# Patient Record
Sex: MALE | Race: BLACK OR AFRICAN AMERICAN
[De-identification: names, ages, dates, MRNs, and addresses within clinical notes are randomized per-mention and may not be internally consistent; named-entity substitution may affect disease eponyms.]

---

## 2018-05-14 ENCOUNTER — HOSPITAL ENCOUNTER (EMERGENCY)
Dept: HOSPITAL 97 - ER | Age: 23
LOS: 1 days | Discharge: HOME | End: 2018-05-15
Payer: COMMERCIAL

## 2018-05-14 DIAGNOSIS — Z72.0: ICD-10-CM

## 2018-05-14 DIAGNOSIS — J30.9: Primary | ICD-10-CM

## 2018-05-14 PROCEDURE — 87081 CULTURE SCREEN ONLY: CPT

## 2018-05-14 PROCEDURE — 99283 EMERGENCY DEPT VISIT LOW MDM: CPT

## 2018-05-14 PROCEDURE — 71046 X-RAY EXAM CHEST 2 VIEWS: CPT

## 2018-05-14 PROCEDURE — 87070 CULTURE OTHR SPECIMN AEROBIC: CPT

## 2018-05-15 NOTE — ER
Nurse's Notes                                                                                     

 [facility]                                                                

Name: Tito Anthony                                                                                

Age: 22 yrs                                                                                       

Sex: Male                                                                                         

: 1995                                                                                   

MRN: [id number]                                                                                   

Arrival Date: 2018                                                                          

Time: 23:01                                                                                       

Account#: [id number]                                                                            

Bed 16                                                                                            

Private MD:                                                                                       

Diagnosis: Allergic rhinitis, unspecified                                                         

                                                                                                  

Presentation:                                                                                     

                                                                                             

23:10 Presenting complaint: Patient states: throat pain, nausea, vomiting, SOB started        ak1 

      tonight. no resp distress noted. Transition of care: patient was not received from          

      another setting of care. Onset of symptoms was May 14, 2018. Initial Sepsis Screen:         

      Does the patient meet any 2 criteria? No. Patient's initial sepsis screen is negative.      

      Does the patient have a suspected source of infection? No. Patient's initial sepsis         

      screen is negative. Care prior to arrival: None.                                            

23:10 Method Of Arrival: Ambulatory                                                           ak1 

23:10 Acuity: RASHI 4                                                                           ak1 

                                                                                                  

Triage Assessment:                                                                                

23:12 General: Appears in no apparent distress. Behavior is calm, cooperative. Pain:          ak1 

      Complains of pain in throat. Neuro: No deficits noted. Cardiovascular: No deficits          

      noted. Respiratory: Reports shortness of breath Onset: The symptoms/episode                 

      began/occurred today, the patient has mild shortness of breath. GI: No signs and/or         

      symptoms were reported involving the gastrointestinal system. : No signs and/or           

      symptoms were reported regarding the genitourinary system. Derm: No signs and/or            

      symptoms reported regarding the dermatologic system. Musculoskeletal: No signs and/or       

      symptoms reported regarding the musculoskeletal system.                                     

                                                                                                  

Historical:                                                                                       

- Allergies:                                                                                      

23:12 No Known Allergies;                                                                     ak1 

- Home Meds:                                                                                      

23:12 None [Active];                                                                          ak1 

- PMHx:                                                                                           

23:12 None;                                                                                   ak1 

- PSHx:                                                                                           

23:12 None;                                                                                   ak1 

                                                                                                  

- Immunization history:: Adult Immunizations unknown.                                             

- Social history:: Smoking status: Patient uses tobacco products, denies chronic                  

  smoking, but will smoke occasionally.                                                           

                                                                                                  

                                                                                                  

Screenin:13 Abuse screen: Denies threats or abuse. Denies injuries from another. Nutritional        ak1 

      screening: No deficits noted. Tuberculosis screening: No symptoms or risk factors           

      identified. Fall Risk None identified.                                                      

                                                                                                  

Assessment:                                                                                       

23:13 Respiratory: Airway is patent Respiratory effort is even, unlabored,                    ak1 

23:14 Cardiovascular: Rhythm is regular.                                                      ak1 

23:15 General: Appears in no apparent distress. uncomfortable, Behavior is calm, cooperative. bs1 

      Pain: Complains of pain in throat. Neuro: Level of Consciousness is awake, alert, obeys     

      commands, Oriented to person, place, time, situation. Neuro: Reports difficulty             

      swallowing. Cardiovascular: Denies chest pain, shortness of breath, Heart tones S1 S2       

      present Capillary refill < 3 seconds Patient's skin is warm and dry. Respiratory:           

      Reports Airway is patent Trachea midline Respiratory effort is even, unlabored,             

      Respiratory pattern is regular, symmetrical. Respiratory: Reports cough that is             

      non-productive, difficulty swallowing, states "I feel like there's something in the         

      back of my throat.". GI: Abdomen is non-distended, Bowel sounds present X 4 quads.          

      Reports vomiting. : No deficits noted. No signs and/or symptoms were reported             

      regarding the genitourinary system. EENT: Throat is pink Reports pain when swallowing.      

      Derm: Skin is intact, Skin is pink, warm \T\ dry. Musculoskeletal: Circulation, motion,     

      and sensation intact. Capillary refill < 3 seconds, Range of motion: intact in all          

      extremities.                                                                                

05/15                                                                                             

00:15 Reassessment: No changes from previously documented assessment. Patient and/or family   bs1 

      updated on plan of care and expected duration. Pain level reassessed. Patient is alert,     

      oriented x 3, equal unlabored respirations, skin warm/dry/pink. patient states "I feel      

      like there is something stuck in the back of my throat.".                                   

01:53 Reassessment: Patient appears in no apparent distress at this time. Patient and/or      bs1 

      family updated on plan of care and expected duration. Pain level reassessed. Patient is     

      alert, oriented x 3, equal unlabored respirations, skin warm/dry/pink. Patient states       

      symptoms have improved.                                                                     

                                                                                                  

Vital Signs:                                                                                      

                                                                                             

23:12  / 73; Pulse 91; Resp 20; Temp 98.4(TE); Pulse Ox 97% on R/A; Weight 90.72 kg     ak1 

      (R); Height 5 ft. 7 in. (170.18 cm) (R); Pain 9/10;                                         

05/15                                                                                             

00:15  / 72; Pulse 86; Resp 16; Pulse Ox 99% on R/A;                                    bs1 

01:15  / 74; Pulse 63; Resp 16; Temp 98(O); Pulse Ox 100% ; Pain 0/10;                  bs1 

                                                                                             

23:12 Body Mass Index 31.32 (90.72 kg, 170.18 cm)                                             ak1 

                                                                                                  

ED Course:                                                                                        

                                                                                             

23:01 Patient arrived in ED.                                                                  luis  

23:10 Diana Alatorre, RN is Primary Nurse.                                                 bs1 

23:11 Triage completed.                                                                       ak1 

23:12 Arm band placed on Patient placed in an exam room, Patient notified of wait time.       ak1 

23:13 Patient has correct armband on for positive identification.                             ak1 

23:33 Bell Padilla FNP-C is PHCP.                                                        snw 

23:33 Eder Franklin MD is Attending Physician.                                            snw 

23:47 Patient moved to radiology via wheelchair.                                              kw  

23:47 X-ray completed. Patient tolerated procedure well.                                      kw  

23:47 Patient moved back from radiology.                                                      kw  

23:47 Chest Pa And Lat (2 Views) XRAY In Process Unspecified.                                 EDMS

05/15                                                                                             

01:51 No provider procedures requiring assistance completed. Patient did not have IV access   bs1 

      during this emergency room visit.                                                           

                                                                                                  

Administered Medications:                                                                         

00:50 Drug: predniSONE 40 mg Route: PO;                                                       bs1 

01:53 Follow up: Response: No adverse reaction                                                bs1 

00:50 Drug: Pepcid 20 mg Route: PO;                                                           bs1 

01:53 Follow up: Response: No adverse reaction                                                bs1 

                                                                                                  

                                                                                                  

Outcome:                                                                                          

01:39 Discharge ordered by MD.                                                                snw 

01:51 Discharged to home ambulatory, with family.                                             bs1 

01:51 Condition: stable                                                                           

01:51 Discharge instructions given to patient, Instructed on discharge instructions, follow       

      up and referral plans. medication usage, Demonstrated understanding of instructions,        

      follow-up care, medications, Prescriptions given X 4, Patient/family state                  

      understanding of POC/follow up                                                              

01:53 Patient left the ED.                                                                    bs1 

                                                                                                  

Signatures:                                                                                       

Dispatcher [employer]                           EDMS                                                 

Bell Padilla FNP-C                 FNP-Csnw                                                  

Randi Whatley Kimberlee kw Krenek, Amber, RN                       RN   ak1                                                  

Diana Alatorre, RN                   RN   bs1                                                  

                                                                                                  

Corrections: (The following items were deleted from the chart)                                    

                                                                                             

23:46 23:15 EENT: No deficits noted. No signs and/or symptoms were reported regarding the     bs1 

      EENT system. bs1                                                                            

                                                                                                  

**************************************************************************************************

## 2018-05-15 NOTE — EDPHYS
Physician Documentation                                                                           

 [facility]                                                                

Name: Tito Anthony                                                                                

Age: 22 yrs                                                                                       

Sex: Male                                                                                         

: 1995                                                                                   

MRN: [id number]                                                                                   

Arrival Date: 2018                                                                          

Time: 23:01                                                                                       

Account#: [id number]                                                                            

Bed 16                                                                                            

Private MD:                                                                                       

ED Physician Eder Franklin                                                                     

HPI:                                                                                              

05/15                                                                                             

02:17 This 22 yrs old Black Male presents to ER via Ambulatory with complaints of Breathing   snw 

      Difficulty, Vomiting, Sore Throat.                                                          

02:17 The patient has shortness of breath at rest. Onset: The symptoms/episode began/occurred snw 

      suddenly, just prior to arrival. Duration: The symptoms are continuous. Associated          

      signs and symptoms: The patient has no apparent associated signs or symptoms. Severity      

      of symptoms: At their worst the symptoms were moderate. The patient has not experienced     

      similar symptoms in the past. The patient has not recently seen a physician. recent         

      severe allergies and post nasal drip.                                                       

                                                                                                  

Historical:                                                                                       

- Allergies:                                                                                      

                                                                                             

23:12 No Known Allergies;                                                                     ak1 

- Home Meds:                                                                                      

23:12 None [Active];                                                                          ak1 

- PMHx:                                                                                           

23:12 None;                                                                                   ak1 

- PSHx:                                                                                           

23:12 None;                                                                                   ak1 

                                                                                                  

- Immunization history:: Adult Immunizations unknown.                                             

- Social history:: Smoking status: Patient uses tobacco products, denies chronic                  

  smoking, but will smoke occasionally.                                                           

                                                                                                  

                                                                                                  

ROS:                                                                                              

05/15                                                                                             

02:15 Constitutional: Negative for fever, chills, and weight loss, Eyes: Negative for injury, snw 

      pain, redness, and discharge, ENT: Negative for injury and discharge, positive for sore     

      throat and feeling like something is hung in his throat Neck: Negative for injury,          

      pain, and swelling, Cardiovascular: Negative for chest pain, palpitations, and edema,       

      Respiratory: Positive for shortness of breath, cough, No wheezing or pleuritic chest        

      pain, Abdomen/GI: Negative for abdominal pain, nausea, vomiting, diarrhea, and              

      constipation, Back: Negative for injury and pain, : Negative for injury, bleeding,        

      discharge, and swelling, MS/Extremity: Negative for injury and deformity, Skin:             

      Negative for injury, rash, and discoloration, Neuro: Negative for headache, weakness,       

      numbness, tingling, and seizure, Psych: Negative for depression, anxiety, suicide           

      ideation, homicidal ideation, and hallucinations.                                           

                                                                                                  

Exam:                                                                                             

02:15 Constitutional:  This is a well developed, well nourished patient who is awake, alert,  snw 

      and in no acute distress. Head/Face:  Normocephalic, atraumatic. Eyes:  Pupils equal        

      round and reactive to light, extra-ocular motions intact.  Lids and lashes normal.          

      Conjunctiva and sclera are non-icteric and not injected.  Cornea within normal limits.      

      Periorbital areas with no swelling, redness, or edema. ENT:  Nares patent. No nasal         

      discharge, no septal abnormalities noted.  Tympanic membranes are normal and external       

      auditory canals are clear.  Oropharynx with no redness, swelling, or masses, exudates,      

      or evidence of obstruction, uvula midline.  Mucous membranes moist. Neck:  Trachea          

      midline, no thyromegaly or masses palpated, and no cervical lymphadenopathy.  Supple,       

      full range of motion without nuchal rigidity, or vertebral point tenderness.  No            

      Meningismus. Chest/axilla:  Normal chest wall appearance and motion.  Nontender with no     

      deformity.  No lesions are appreciated. Cardiovascular:  Regular rate and rhythm with a     

      normal S1 and S2.  No gallops, murmurs, or rubs.  Normal PMI, no JVD.  No pulse             

      deficits. Respiratory:  Lungs have equal breath sounds bilaterally, clear to                

      auscultation and percussion.  No rales, rhonchi or wheezes noted.  No increased work of     

      breathing, no retractions or nasal flaring. Abdomen/GI:  Soft, non-tender, with normal      

      bowel sounds.  No distension or tympany.  No guarding or rebound.  No evidence of           

      tenderness throughout. Back:  No spinal tenderness.  No costovertebral tenderness.          

      Full range of motion. Skin:  Warm, dry with normal turgor.  Normal color with no            

      rashes, no lesions, and no evidence of cellulitis. MS/ Extremity:  Pulses equal, no         

      cyanosis.  Neurovascular intact.  Full, normal range of motion. Neuro:  Awake and           

      alert, GCS 15, oriented to person, place, time, and situation.  Cranial nerves II-XII       

      grossly intact.  Motor strength 5/5 in all extremities.  Sensory grossly intact.            

      Cerebellar exam normal.  Normal gait.                                                       

                                                                                                  

Vital Signs:                                                                                      

                                                                                             

23:12  / 73; Pulse 91; Resp 20; Temp 98.4(TE); Pulse Ox 97% on R/A; Weight 90.72 kg     ak1 

      (R); Height 5 ft. 7 in. (170.18 cm) (R); Pain 9/10;                                         

05/15                                                                                             

00:15  / 72; Pulse 86; Resp 16; Pulse Ox 99% on R/A;                                    bs1 

01:15  / 74; Pulse 63; Resp 16; Temp 98(O); Pulse Ox 100% ; Pain 0/10;                  bs1 

                                                                                             

23:12 Body Mass Index 31.32 (90.72 kg, 170.18 cm)                                             ak1 

                                                                                                  

MDM:                                                                                              

                                                                                             

23:33 Patient medically screened.                                                             snw 

05/15                                                                                             

02:17 Data reviewed: vital signs, nurses notes. Data interpreted: Pulse oximetry: on room air snw 

      is 100 %. Interpretation: normal. Counseling: I had a detailed discussion with the          

      patient and/or guardian regarding: the historical points, exam findings, and any            

      diagnostic results supporting the discharge/admit diagnosis, lab results, radiology         

      results, the need for outpatient follow up, to return to the emergency department if        

      symptoms worsen or persist or if there are any questions or concerns that arise at          

      home. Special discussion: Based on the history and exam findings, there is no               

      indication for further emergent testing or inpatient evaluation. I discussed with the       

      patient/guardian the need to see the primary care provider for further evaluation of        

      the symptoms.                                                                               

                                                                                                  

                                                                                             

23:33 Order name: Strep; Complete Time: 00:26                                                 snw 

05/15                                                                                             

00:15 Order name: Throat Culture                                                              EDMS

                                                                                             

23:33 Order name: Chest Pa And Lat (2 Views) XRAY                                             snw 

05/15                                                                                             

00:29 Order name: Misc. Order: saline neb treatment (use about 6ml ns); Complete Time: 00:50  snw 

                                                                                                  

Administered Medications:                                                                         

00:50 Drug: predniSONE 40 mg Route: PO;                                                       bs1 

01:53 Follow up: Response: No adverse reaction                                                bs1 

00:50 Drug: Pepcid 20 mg Route: PO;                                                           bs1 

01:53 Follow up: Response: No adverse reaction                                                bs1 

                                                                                                  

                                                                                                  

Disposition:                                                                                      

03:30 Co-signature as Attending Physician, Eder Franklin MD I agree with the assessment and tw4 

      plan of care.                                                                               

                                                                                                  

Disposition:                                                                                      

05/15/18 01:39 Discharged to Home. Impression: Allergic rhinitis, unspecified.                    

- Condition is Stable.                                                                            

- Discharge Instructions: Allergies, Hay Fever, Allergic Rhinitis, Cough, Adult.                  

- Prescriptions for Flonase Allergy Relief 50 mcg/actuation Nasal spray,suspension -              

  inhale 1 spray by INTRANASAL route once daily; 1 Cartridge. Zyrtec 10 mg Oral Tablet            

  - take 1 tablet by ORAL route once daily As needed; 20 tablet. Prednisone 20 mg Oral            

  Tablet - take 2 tablet by ORAL route once daily for 5 days; 10 tablet. Pepcid 20 mg             

  Oral Tablet - take 1 tablet by ORAL route once daily; 20 tablet.                                

- Work release form, Medication Reconciliation Form, Thank You Letter, Antibiotic                 

  Education, Prescription Opioid Use form.                                                        

- Follow up: Private Physician; When: 2 - 3 days; Reason: Recheck today's complaints,             

  Continuance of care, Re-evaluation by your physician. Follow up: Emergency                      

  Department; When: As needed; Reason: Worsening of condition.                                    

                                                                                                  

                                                                                                  

                                                                                                  

Signatures:                                                                                       

Dispatcher [employer]                           EDMS                                                 

Bell Padilla FNP-C FNP-Zina Brantley, RN                       RN   ak1                                                  

Diana Alatorre RN                   RN   bs1                                                  

Eder Franklin MD MD   tw4                                                  

                                                                                                  

Corrections: (The following items were deleted from the chart)                                    

01:53 01:39 05/15/2018 01:39 Discharged to Home. Impression: Allergic rhinitis, unspecified.  bs1 

      Condition is Stable. Forms are Medication Reconciliation Form, Thank You Letter,            

      Antibiotic Education, Prescription Opioid Use. Follow up: Private Physician; When: 2 -      

      3 days; Reason: Recheck today's complaints, Continuance of care, Re-evaluation by your      

      physician. Follow up: Emergency Department; When: As needed; Reason: Worsening of           

      condition. snw                                                                              

                                                                                                  

**************************************************************************************************

## 2018-05-15 NOTE — RAD REPORT
EXAM DESCRIPTION:  RAD - Chest Pa And Lat (2 Views) - 5/14/2018 11:50 pm

 

CLINICAL HISTORY:  Throat pain, shortness of breath, vomiting, occasional smoking

 

COMPARISON:  None.

 

TECHNIQUE:  PA and lateral views of the chest were obtained.

 

FINDINGS:  The lungs are clear.   Heart size is upper normal. No vascular engorgement. No pleural eff
usion or pneumothorax seen.  No acute bony finding noted.  No aortic abnormality.

 

IMPRESSION:  No acute cardiopulmonary process.

## 2021-04-02 ENCOUNTER — HOSPITAL ENCOUNTER (EMERGENCY)
Dept: HOSPITAL 97 - ER | Age: 26
Discharge: HOME | End: 2021-04-02
Payer: COMMERCIAL

## 2021-04-02 DIAGNOSIS — J06.9: ICD-10-CM

## 2021-04-02 DIAGNOSIS — E87.6: ICD-10-CM

## 2021-04-02 DIAGNOSIS — U07.1: Primary | ICD-10-CM

## 2021-04-02 DIAGNOSIS — R50.9: ICD-10-CM

## 2021-04-02 DIAGNOSIS — D72.825: ICD-10-CM

## 2021-04-02 LAB
ALBUMIN SERPL BCP-MCNC: 3.6 G/DL (ref 3.4–5)
ALP SERPL-CCNC: 44 U/L (ref 45–117)
ALT SERPL W P-5'-P-CCNC: 48 U/L (ref 12–78)
AST SERPL W P-5'-P-CCNC: 29 U/L (ref 15–37)
BUN BLD-MCNC: 9 MG/DL (ref 7–18)
GLUCOSE SERPLBLD-MCNC: 101 MG/DL (ref 74–106)
HCT VFR BLD CALC: 40.3 % (ref 39.6–49)
LYMPHOCYTES # SPEC AUTO: 0.7 K/UL (ref 0.7–4.9)
MORPHOLOGY BLD-IMP: (no result)
PMV BLD: 7.5 FL (ref 7.6–11.3)
POTASSIUM SERPL-SCNC: 3.3 MMOL/L (ref 3.5–5.1)
RBC # BLD: 4.58 M/UL (ref 4.33–5.43)
SARS-COV-2 RNA RESP QL NAA+PROBE: POSITIVE

## 2021-04-02 PROCEDURE — 36415 COLL VENOUS BLD VENIPUNCTURE: CPT

## 2021-04-02 PROCEDURE — 87040 BLOOD CULTURE FOR BACTERIA: CPT

## 2021-04-02 PROCEDURE — 80053 COMPREHEN METABOLIC PANEL: CPT

## 2021-04-02 PROCEDURE — 85025 COMPLETE CBC W/AUTO DIFF WBC: CPT

## 2021-04-02 PROCEDURE — 99284 EMERGENCY DEPT VISIT MOD MDM: CPT

## 2021-04-02 PROCEDURE — 0240U: CPT

## 2021-04-02 PROCEDURE — 71046 X-RAY EXAM CHEST 2 VIEWS: CPT

## 2021-04-02 PROCEDURE — 96375 TX/PRO/DX INJ NEW DRUG ADDON: CPT

## 2021-04-02 PROCEDURE — 96365 THER/PROPH/DIAG IV INF INIT: CPT

## 2021-04-02 NOTE — ER
Nurse's Notes                                                                                     

 [facility]                                                                 

Name: Tito Anthony                                                                                

Age: 25 yrs                                                                                       

Sex: Male                                                                                         

: 1995                                                                                   

MRN: [id number]                                                                                   

Arrival Date: 2021                                                                          

Time: 01:33                                                                                       

Account#: [id number]                                                                            

Bed 6                                                                                             

Private MD:                                                                                       

Diagnosis: Dyspnea;Fever, unspecified;Acute upper respiratory infection,                          

  unspecified;Hypokalemia;Bandemia                                                                

                                                                                                  

Presentation:                                                                                     

                                                                                             

01:40 Ebola Screen: No symptoms or risks identified at this time. Onset of symptoms was .                                                                                   

01:43 Risk Assessment: Do you want to hurt yourself or someone else? Patient reports no       ea  

      desire to harm self or others.                                                              

01:45 Chief complaint: Patient states: he feels fine but his girlfriend says he is burning up bb  

      he has seasonal allergies and is having those same symptoms now with a cough.               

      Coronavirus screen: fever, headache. Initial Sepsis Screen: Does the patient meet any 2     

      criteria? No. Patient's initial sepsis screen is negative. Does the patient have a          

      suspected source of infection? No. Patient's initial sepsis screen is negative.             

01:45 Method Of Arrival: Ambulatory                                                           bb  

01:45 Acuity: RASHI 4                                                                           bb  

                                                                                                  

Historical:                                                                                       

- Allergies:                                                                                      

01:42 No Known Allergies;                                                                     ea  

- Home Meds:                                                                                      

02:52 None [Active];                                                                          sf  

- PMHx:                                                                                           

02:52 None;                                                                                   sf  

- PSHx:                                                                                           

02:52 None;                                                                                   sf  

                                                                                                  

- Immunization history:: Adult Immunizations unknown.                                             

- Social history:: Smoking status: unknown.                                                       

                                                                                                  

                                                                                                  

Screenin:39 Abuse screen: Denies threats or abuse. Nutritional screening: No deficits noted.        ea  

      Tuberculosis screening: No symptoms or risk factors identified. Fall Risk None              

      identified.                                                                                 

                                                                                                  

Assessment:                                                                                       

01:40 General: Appears in no apparent distress. comfortable, Behavior is calm, cooperative.   sf  

      Pain: Complains of pain in face. Neuro: No deficits noted. Level of Consciousness is        

      awake, alert, Oriented to person, place. Cardiovascular: No deficits noted. Patient's       

      skin is warm and dry. Rhythm is regular. Respiratory: Reports shortness of breath cough     

      that is Airway is patent Respiratory effort is even, unlabored, Respiratory pattern is      

      regular, symmetrical, Breath sounds are clear bilaterally. GI: No signs and/or symptoms     

      were reported involving the gastrointestinal system. : No signs and/or symptoms were      

      reported regarding the genitourinary system. Derm: No signs and/or symptoms reported        

      regarding the dermatologic system. Musculoskeletal: No signs and/or symptoms reported       

      regarding the musculoskeletal system.                                                       

02:30 Reassessment: Patient appears in no apparent distress at this time. No changes from     sf  

      previously documented assessment. Patient and/or family updated on plan of care and         

      expected duration. Pain level reassessed. Patient is alert, oriented x 3, equal             

      unlabored respirations, skin warm/dry/pink.                                                 

03:44 Reassessment: Patient appears in no apparent distress at this time. No changes from     sf  

      previously documented assessment. Patient and/or family updated on plan of care and         

      expected duration. Pain level reassessed. Patient is alert, oriented x 3, equal             

      unlabored respirations, skin warm/dry/pink.                                                 

                                                                                                  

Vital Signs:                                                                                      

01:45  / 85; Pulse 87; Resp 16 S; Temp 100.1(O); Pulse Ox 98% on R/A; Weight 129.27 kg  bb  

      (R); Height 5 ft. 7 in. (170.18 cm) (R); Pain 0/10;                                         

01:45  / 85; Pulse 87; Resp 18; Pulse Ox 98% ;                                          sf  

02:00  / 84; Pulse 87; Resp 18; Pulse Ox 95% ;                                          sf  

02:30  / 78; Pulse 80; Resp 18; Pulse Ox 96% ;                                          sf  

03:00  / 75; Pulse 82; Resp 18; Pulse Ox 95% ;                                          sf  

03:36  / 62; Pulse 73; Resp 18; Pulse Ox 99% ;                                          sf  

04:00  / 86; Pulse 81; Resp 16; Pulse Ox 96% ;                                          sf  

01:45 Body Mass Index 44.64 (129.27 kg, 170.18 cm)                                              

                                                                                                  

ED Course:                                                                                        

01:33 Patient arrived in ED.                                                                  cl3 

01:37 Jorge Diego MD is Attending Physician.                                             wenceslao 

01:40 Patient has correct armband on for positive identification. Placed in gown. Bed in low  ea  

      position. Call light in reach. Pulse ox on. NIBP on.                                        

01:42 Gaetano Hess RN is Primary Nurse.                                               sf  

01:42 Arm band placed on right wrist. Patient placed in an exam room, on a stretcher, on      ea  

      pulse oximetry.                                                                             

01:47 Triage completed.                                                                       bb  

02:15 Initial lab(s) drawn, by me, sent to lab. First set of blood cultures drawn by me,      sf  

      COVID swab sent to lab. Flu and/or RSV swab sent to lab. Inserted saline lock: 20 gauge     

      in right forearm, using aseptic technique. Blood collected.                                 

02:30 Second set of blood cultures drawn by ED staff.                                         sf  

03:04 Chest Pa And Lat (2 Views) XRAY Sent.                                                   sf  

03:04 X-ray(s) taken.                                                                         sf  

03:54 Chest Pa And Lat (2 Views) XRAY In Process Unspecified.                                 EDMS

04:18 No provider procedures requiring assistance completed. IV discontinued, intact,         sf  

      bleeding controlled, No redness/swelling at site. Pressure dressing applied.                

                                                                                                  

Administered Medications:                                                                         

02:35 Drug: NS 0.9% 500 ml Route: IV; Rate: bolus; Site: right antecubital;                   ea  

03:41 Follow up: Response: No adverse reaction                                                sf  

03:41 Follow up: IV Status: Completed infusion; IV Intake: 500ml                              sf  

02:35 Drug: Albuterol HFA Inhaler 4 puffs Route: Inhalation;                                  ea  

03:40 Follow up: Response: No adverse reaction                                                sf  

02:36 Drug: Motrin (ibuprofen) 800 mg Route: PO;                                              ea  

03:41 Follow up: Response: No adverse reaction                                                sf  

02:36 Drug: Pepcid (famotidine) 20 mg Route: IVP; Site: right antecubital;                    ea  

03:40 Follow up: Response: No adverse reaction                                                sf  

02:36 Drug: Decadron - Dexamethasone 10 mg Route: IVP; Site: right antecubital;               ea  

03:40 Follow up: Response: No adverse reaction                                                sf  

02:36 Drug: Rocephin - (cefTRIAXone) 1 grams Route: IVPB; Infused Over: 30 mins; Site: right  ea  

      antecubital;                                                                                

02:50 Follow up: IV Status: Completed infusion                                                sf  

02:50 Follow up: IV Intake: 10ml                                                              sf  

03:40 Follow up: Response: No adverse reaction                                                sf  

02:53 Drug: Zithromax (azithromycin) 500 mg Route: IVPB; Infused Over: 1 hrs; Site: right     ea  

      antecubital;                                                                                

04:10 Follow up: Response: No adverse reaction; IV Status: Completed infusion; IV Intake:     sf  

      250ml                                                                                       

03:40 Drug: Potassium Effervescent Tablet 25 mEq Route: PO;                                   sf  

04:04 Follow up: Response: No adverse reaction                                                ea  

04:09 Follow up: Response: No adverse reaction                                                sf  

                                                                                                  

                                                                                                  

Intake:                                                                                           

02:50 IV: 10ml; Total: 10ml.                                                                  sf  

03:41 IV: 500ml; Total: 510ml.                                                                sf  

04:10 IV: 250ml; Total: 760ml.                                                                sf  

                                                                                                  

Outcome:                                                                                          

03:41 Discharge ordered by MD.                                                                wenceslao 

04:18 Discharged to home ambulatory.                                                          sf  

04:18 Condition: stable                                                                           

04:18 Discharge instructions given to patient, Instructed on discharge instructions, follow       

      up and referral plans. medication usage, Demonstrated understanding of instructions,        

      follow-up care, medications, Prescriptions given X 4.                                       

04:19 Patient left the ED.                                                                    sf  

                                                                                                  

Signatures:                                                                                       

Dispatcher [employer]                           EDMS                                                 

Jorge Diego MD MD cha Ballard, Brenda, RN                     RN   Rubi Lugo RN RN ea Lewis, Charde cl3 Fitzpatrick, Steven, RN                 RN   sf                                                   

                                                                                                  

**************************************************************************************************

## 2021-04-02 NOTE — XMS REPORT
Continuity of Care Document

                            Created on:2021



Patient:STEVEN CORADO

Sex:Male

:1995

External Reference #:[id number]





Demographics







                          Address                   [address]



                                                    [place] [address]

 

                          Home Phone                [phone]

 

                          Work Phone                [phone]

 

                          Mobile Phone              [phone]

 

                          Email Address             NONE

 

                          Preferred Language        English

 

                          Marital Status            Unknown

 

                          [employer] Affiliation     000

 

                          Race                      Unknown

 

                          Additional Race(s)        Unavailable



                                                    Black or 

 

                          Ethnic Group              Unknown









Author







                          Organization              [facility]

t

 

                          Address                   [address]



                                                    [place] [address]

 

                          Phone                     [phone]









Support







                Name            Relationship    Address         Phone

 

                Raj           Mother          Unavailable     [phone]









Care Team Providers







                    Name                Role                Phone

 

                    Lab,  Covid         Attending Clinician Unavailable

 

                    Virgilio GARCIA             Attending Clinician Unavailable

 

                    Only,  Test         Attending Clinician Unavailable









Problems

This patient has no known problems.



Allergies, Adverse Reactions, Alerts

This patient has no known allergies or adverse reactions.



Medications

This patient has no known medications.



Procedures

This patient has no known procedures.



Encounters







        Start   End     Encounter Admission Attending Care    Care    Encounter 

Source



        Date/Time Date/Time Type    Type    Clinicians Facility Department ID   

   

 

        2020-10-22 2020-10-22 Letter          Lab, Pcp [facility]    [id number]

[id number] 



        00:00:00 00:00:00 (Out)           [facility]  [id number]         



                                                [place] [id number]         



                                                [facility] [phone]         



                                                nal     044             



                                                Office                  



                                                Building                 



                                                One                     

 

        2020-10-21 2020-10-21 Telephone         Hilda Poole    [id number] 

[id number] 



        00:00:00 00:00:00                         ROSIBEL   [id number]         



                                                [facility] [id number]         



                                                        [phone]         



                                                        019             

 

        2020-10-20 2020-10-20 Laboratory         Only, Adc [facility]    [id number]

[id number] 



        14:34:58 14:49:58 Only            Test    [place] [id number]         



                                                [place] [id number]         



                                                [place]  [phone]         



                                                        353             







Results

This patient has no known results.

## 2021-04-02 NOTE — RAD REPORT
EXAM DESCRIPTION:  RAD - Chest Pa And Lat (2 Views) - 4/2/2021 3:54 am

 

CLINICAL HISTORY:  COUGH, fever

 

COMPARISON:  May 2018

 

TECHNIQUE:  Frontal and lateral views of the chest were obtained.

 

FINDINGS:  The lungs are normal volume. No dense mass or consolidation. Subtle parenchymal opacity pr
esent right lung base new from the prior study. This may be a summation artifact. This is seen only o
n the frontal projection. This is a minimal finding but could reflect a very mild or early right base
 infiltrate. Correlation is needed with exam findings.

 

  Trachea is midline. No air trapping. Heart size is normal and central vasculature is within normal 
limits.  No pleural effusion or pneumothorax seen.  No acute bony finding noted.  No aortic abnormali
ty.

 

IMPRESSION:  Minimal, subtle parenchymal opacification right base.  This is probably still normal ran
ge but could indicate very early stages of a right base pneumonia. Correlation is needed with any sup
porting clinical or laboratory findings.

## 2021-04-02 NOTE — EDPHYS
Physician Documentation                                                                           

 [facility]                                                                 

Name: Tito Anthony                                                                                

Age: 25 yrs                                                                                       

Sex: Male                                                                                         

: 1995                                                                                   

MRN: [id number]                                                                                   

Arrival Date: 2021                                                                          

Time: 01:33                                                                                       

Account#: [id number]                                                                            

Bed 6                                                                                             

Private MD:                                                                                       

AGNIESZ[facility] Physician Jorge Diego                                                                      

HPI:                                                                                              

                                                                                             

02:03 This 25 yrs old Black Male presents to ER via Ambulatory with complaints of Breathing   wenceslao 

      Difficulty.                                                                                 

02:03 The patient has shortness of breath at rest, with light activity. Onset: The            wenceslao 

      symptoms/episode began/occurred 1 day(s) ago. Duration: The symptoms are continuous,        

      and are steadily getting worse.                                                             

                                                                                                  

Historical:                                                                                       

- Allergies:                                                                                      

01:42 No Known Allergies;                                                                     ea  

- Home Meds:                                                                                      

02:52 None [Active];                                                                          sf  

- PMHx:                                                                                           

02:52 None;                                                                                   sf  

- PSHx:                                                                                           

02:52 None;                                                                                   sf  

                                                                                                  

- Immunization history:: Adult Immunizations unknown.                                             

- Social history:: Smoking status: unknown.                                                       

                                                                                                  

                                                                                                  

ROS:                                                                                              

02:05 Eyes: Negative for injury, pain, redness, and discharge, ENT: Negative for injury,      wenceslao 

      pain, and discharge, Neck: Negative for injury, pain, and swelling, Cardiovascular:         

      Negative for chest pain, palpitations, and edema, Abdomen/GI: Negative for abdominal        

      pain, nausea, vomiting, diarrhea, and constipation, Back: Negative for injury and pain,     

      : Negative for injury, bleeding, discharge, and swelling, MS/Extremity: Negative for      

      injury and deformity, Skin: Negative for injury, rash, and discoloration, Neuro:            

      Negative for headache, weakness, numbness, tingling, and seizure, Psych: Negative for       

      depression, anxiety, suicide ideation, homicidal ideation, and hallucinations,              

      Allergy/Immunology: Negative for hives, rash, and allergies, Endocrine: Negative for        

      neck swelling, polydipsia, polyuria, polyphagia, and marked weight changes,                 

      Hematologic/Lymphatic: Negative for swollen nodes, abnormal bleeding, and unusual           

      bruising.                                                                                   

02:05 Respiratory: Positive for cough.                                                            

                                                                                                  

Exam:                                                                                             

02:05 Head/Face:  Normocephalic, atraumatic. Eyes:  Pupils equal round and reactive to light, wenceslao 

      extra-ocular motions intact.  Lids and lashes normal.  Conjunctiva and sclera are           

      non-icteric and not injected.  Cornea within normal limits.  Periorbital areas with no      

      swelling, redness, or edema. ENT:  Nares patent. No nasal discharge, no septal              

      abnormalities noted.  Tympanic membranes are normal and external auditory canals are        

      clear.  Oropharynx with no redness, swelling, or masses, exudates, or evidence of           

      obstruction, uvula midline.  Mucous membranes moist. Neck:  Trachea midline, no             

      thyromegaly or masses palpated, and no cervical lymphadenopathy.  Supple, full range of     

      motion without nuchal rigidity, or vertebral point tenderness.  No Meningismus.             

      Chest/axilla:  Normal chest wall appearance and motion.  Nontender with no deformity.       

      No lesions are appreciated. Cardiovascular:  Regular rate and rhythm with a normal S1       

      and S2.  No gallops, murmurs, or rubs.  Normal PMI, no JVD.  No pulse deficits.             

      Respiratory:  Lungs have equal breath sounds bilaterally, clear to auscultation and         

      percussion.  No rales, rhonchi or wheezes noted.  No increased work of breathing, no        

      retractions or nasal flaring. Abdomen/GI:  Soft, non-tender, with normal bowel sounds.      

      No distension or tympany.  No guarding or rebound.  No evidence of tenderness               

      throughout. Back:  No spinal tenderness.  No costovertebral tenderness.  Full range of      

      motion. Male :  Normal genitalia with no discharge or lesions. Skin:  Warm, dry with      

      normal turgor.  Normal color with no rashes, no lesions, and no evidence of cellulitis.     

      MS/ Extremity:  Pulses equal, no cyanosis.  Neurovascular intact.  Full, normal range       

      of motion. Neuro:  Awake and alert, GCS 15, oriented to person, place, time, and            

      situation.  Cranial nerves II-XII grossly intact.  Motor strength 5/5 in all                

      extremities.  Sensory grossly intact.  Cerebellar exam normal.  Normal gait. Psych:         

      Awake, alert, with orientation to person, place and time.  Behavior, mood, and affect       

      are within normal limits.                                                                   

02:05 Constitutional: The patient appears febrile, in obvious distress.                           

                                                                                                  

Vital Signs:                                                                                      

01:45  / 85; Pulse 87; Resp 16 S; Temp 100.1(O); Pulse Ox 98% on R/A; Weight 129.27 kg  bb  

      (R); Height 5 ft. 7 in. (170.18 cm) (R); Pain 0/10;                                         

01:45  / 85; Pulse 87; Resp 18; Pulse Ox 98% ;                                          sf  

02:00  / 84; Pulse 87; Resp 18; Pulse Ox 95% ;                                          sf  

02:30  / 78; Pulse 80; Resp 18; Pulse Ox 96% ;                                          sf  

03:00  / 75; Pulse 82; Resp 18; Pulse Ox 95% ;                                          sf  

03:36  / 62; Pulse 73; Resp 18; Pulse Ox 99% ;                                          sf  

04:00  / 86; Pulse 81; Resp 16; Pulse Ox 96% ;                                          sf  

01:45 Body Mass Index 44.64 (129.27 kg, 170.18 cm)                                            bb  

                                                                                                  

MDM:                                                                                              

01:37 Patient medically screened.                                                             wenceslao 

02:06 Differential diagnosis: asthma, Bronchitis pneumonia, reactive airway disease.          wenceslao 

      Antibiotic administration: Rocephin and Zithromax given. The patient's Wells Deep Vein      

      Thrombosis Score was calculated as follows: Total Score: 0-2 Pts- Low Risk. The             

      patient's pulmonary embolism risk score was calculated as follows: Total Score: 0-2         

      points. This patient was found to be at low risk for a pulmonary embolism by using the      

      Well's assessment criteria. Immunization status:. Data reviewed: vital signs, nurses        

      notes, lab test result(s), radiologic studies, plain films. Data interpreted: Cardiac       

      monitor: rate is 87 beats/min, rhythm is regular, Pulse oximetry: is 98 %.                  

      Interpretation: normal. Test interpretation: by ED physician or midlevel provider:          

      plain radiologic studies. Counseling: I had a detailed discussion with the patient          

      and/or guardian regarding: the historical points, exam findings, and any diagnostic         

      results supporting the discharge/admit diagnosis.                                           

                                                                                                  

                                                                                             

02:02 Order name: CBC with Diff; Complete Time: 03:29                                         [facility] 

                                                                                             

02:02 Order name: Comprehensive Metabolic Panel; Complete Time: 03:29                         [facility] 

                                                                                             

02:02 Order name: Blood Culture Adult (2)                                                     [facility] 

                                                                                             

02:44 Order name: Manual Differential; Complete Time: 03:29                                   [facility]

04                                                                                             

02:05 Order name: Chest Pa And Lat (2 Views) XRAY                                             wenceslao 

                                                                                                  

Administered Medications:                                                                         

02:35 Drug: NS 0.9% 500 ml Route: IV; Rate: bolus; Site: right antecubital;                   ea  

03:41 Follow up: Response: No adverse reaction                                                sf  

03:41 Follow up: IV Status: Completed infusion; IV Intake: 500ml                              sf  

02:35 Drug: Albuterol HFA Inhaler 4 puffs Route: Inhalation;                                  ea  

03:40 Follow up: Response: No adverse reaction                                                sf  

02:36 Drug: Motrin (ibuprofen) 800 mg Route: PO;                                              ea  

03:41 Follow up: Response: No adverse reaction                                                sf  

02:36 Drug: Pepcid (famotidine) 20 mg Route: IVP; Site: right antecubital;                    ea  

03:40 Follow up: Response: No adverse reaction                                                sf  

02:36 Drug: Decadron - Dexamethasone 10 mg Route: IVP; Site: right antecubital;               ea  

03:40 Follow up: Response: No adverse reaction                                                sf  

02:36 Drug: Rocephin - (cefTRIAXone) 1 grams Route: IVPB; Infused Over: 30 mins; Site: right  ea  

      antecubital;                                                                                

02:50 Follow up: IV Status: Completed infusion                                                sf  

02:50 Follow up: IV Intake: 10ml                                                              sf  

03:40 Follow up: Response: No adverse reaction                                                sf  

02:53 Drug: Zithromax (azithromycin) 500 mg Route: IVPB; Infused Over: 1 hrs; Site: right     ea  

      antecubital;                                                                                

04:10 Follow up: Response: No adverse reaction; IV Status: Completed infusion; IV Intake:     sf  

      250ml                                                                                       

03:40 Drug: Potassium Effervescent Tablet 25 mEq Route: PO;                                   sf  

04:04 Follow up: Response: No adverse reaction                                                ea  

04:09 Follow up: Response: No adverse reaction                                                sf  

                                                                                                  

                                                                                                  

Disposition:                                                                                      

21 03:41 Discharged to Home. Impression: Dyspnea, Fever, unspecified, Acute upper           

  respiratory infection, unspecified, Hypokalemia, Bandemia.                                      

- Condition is Stable.                                                                            

- Discharge Instructions: Shortness of Breath, Upper Respiratory Infection, Adult, Cool           

  Mist Vaporizer, Upper Respiratory Infection, Adult, Easy-to-Read, Cough, Adult,                 

  Easy-to-Read, Cough, Adult.                                                                     

- Prescriptions for dexamethasone 2 mg Oral tablet - take 1 tablet by ORAL route 3                

  times per day; 15 tablet. Pepcid 20 mg Oral Tablet - take 1 tablet by ORAL route                

  every 12 hours for 10 days; 20 tablet. Albuterol Sulfate 90 mcg/actuation - inhale              

  1-2 puff by INHALATION route every 4-6 hours; 1 Inhaler. Zithromax 500 mg Oral Tablet           

  - take 1 tablet by ORAL route once daily for 4 days; 4 tablet.                                  

- Medication Reconciliation Form, Thank You Letter, Antibiotic Education, Prescription            

  Opioid Use, Work release form, Family Work Release form.                                        

- Follow up: Private Physician; When: 2 - 3 days; Reason: Recheck today's complaints,             

  Continuance of care, Re-evaluation by your physician.                                           

- Problem is new.                                                                                 

- Symptoms have improved.                                                                         

                                                                                                  

                                                                                                  

                                                                                                  

Signatures:                                                                                       

Dispatcher [employer]                           [facility]                                                 

Jorge Diego MD MD cha Antunez, Elena RN                      Gaetano Cohen ea, RN RN   sf                                                   

                                                                                                  

Corrections: (The following items were deleted from the chart)                                    

03:51 02:02 CORONAVIRUS+MR.LAB.BRZ ordered. EDMS                                              EDMS

03:52 02:02 Influenza Screen (A \T\ B)+BA.LAB.BRZ ordered. [facility]                                 EDMS

04:19 03:41 2021 03:41 Discharged to Home. Impression: Dyspnea; Fever, unspecified;     sf  

      Acute upper respiratory infection, unspecified; Hypokalemia; Bandemia. Condition is         

      Stable. Discharge Instructions: Shortness of Breath, Upper Respiratory Infection,           

      Adult, Cool Mist Vaporizer, Upper Respiratory Infection, Adult, Easy-to-Read, Cough,        

      Adult, Easy-to-Read, Cough, Adult. Prescriptions for dexamethasone 2 mg Oral tablet -       

      take 1 tablet by ORAL route 3 times per day; 15 tablet, Pepcid 20 mg Oral Tablet - take     

      1 tablet by ORAL route every 12 hours for 10 days; 20 tablet, Albuterol Sulfate 90          

      mcg/actuation - inhale 1-2 puff by INHALATION route every 4-6 hours; 1 Inhaler,             

      Zithromax 500 mg Oral Tablet - take 1 tablet by ORAL route once daily for 4 days; 4         

      tablet. and Forms are Medication Reconciliation Form, Thank You Letter, Antibiotic          

      Education, Prescription Opioid Use. Follow up: Private Physician; When: 2 - 3 days;         

      Reason: Recheck today's complaints, Continuance of care, Re-evaluation by your              

      physician. Problem is new. Symptoms have improved. wenceslao                                      

                                                                                                  

**************************************************************************************************

## 2021-04-09 ENCOUNTER — HOSPITAL ENCOUNTER (EMERGENCY)
Dept: HOSPITAL 97 - ER | Age: 26
Discharge: HOME | End: 2021-04-09
Payer: COMMERCIAL

## 2021-04-09 VITALS — DIASTOLIC BLOOD PRESSURE: 53 MMHG | SYSTOLIC BLOOD PRESSURE: 107 MMHG

## 2021-04-09 VITALS — TEMPERATURE: 97.1 F | OXYGEN SATURATION: 95 %

## 2021-04-09 DIAGNOSIS — E87.6: ICD-10-CM

## 2021-04-09 DIAGNOSIS — J12.82: ICD-10-CM

## 2021-04-09 DIAGNOSIS — R05: ICD-10-CM

## 2021-04-09 DIAGNOSIS — U07.1: Primary | ICD-10-CM

## 2021-04-09 LAB
ALBUMIN SERPL BCP-MCNC: 3.3 G/DL (ref 3.4–5)
ALP SERPL-CCNC: 37 U/L (ref 45–117)
ALT SERPL W P-5'-P-CCNC: 51 U/L (ref 12–78)
AST SERPL W P-5'-P-CCNC: 41 U/L (ref 15–37)
BUN BLD-MCNC: 9 MG/DL (ref 7–18)
CRP SERPL-MCNC: 112 MG/L (ref ?–3)
FERRITIN SERPL-MCNC: 808.6 NG/ML (ref 26–388)
GLUCOSE SERPLBLD-MCNC: 99 MG/DL (ref 74–106)
HCT VFR BLD CALC: 43.6 % (ref 39.6–49)
LYMPHOCYTES # SPEC AUTO: 0.7 K/UL (ref 0.7–4.9)
NT-PROBNP SERPL-MCNC: 7 PG/ML (ref ?–125)
PMV BLD: 7.9 FL (ref 7.6–11.3)
POTASSIUM SERPL-SCNC: 3.3 MMOL/L (ref 3.5–5.1)
RBC # BLD: 5.05 M/UL (ref 4.33–5.43)

## 2021-04-09 PROCEDURE — 71045 X-RAY EXAM CHEST 1 VIEW: CPT

## 2021-04-09 PROCEDURE — 87081 CULTURE SCREEN ONLY: CPT

## 2021-04-09 PROCEDURE — 99284 EMERGENCY DEPT VISIT MOD MDM: CPT

## 2021-04-09 PROCEDURE — 86140 C-REACTIVE PROTEIN: CPT

## 2021-04-09 PROCEDURE — 82728 ASSAY OF FERRITIN: CPT

## 2021-04-09 PROCEDURE — 85025 COMPLETE CBC W/AUTO DIFF WBC: CPT

## 2021-04-09 PROCEDURE — 96365 THER/PROPH/DIAG IV INF INIT: CPT

## 2021-04-09 PROCEDURE — 96366 THER/PROPH/DIAG IV INF ADDON: CPT

## 2021-04-09 PROCEDURE — 96375 TX/PRO/DX INJ NEW DRUG ADDON: CPT

## 2021-04-09 PROCEDURE — 80053 COMPREHEN METABOLIC PANEL: CPT

## 2021-04-09 PROCEDURE — 83880 ASSAY OF NATRIURETIC PEPTIDE: CPT

## 2021-04-09 PROCEDURE — 96368 THER/DIAG CONCURRENT INF: CPT

## 2021-04-09 PROCEDURE — 36415 COLL VENOUS BLD VENIPUNCTURE: CPT

## 2021-04-09 PROCEDURE — 87070 CULTURE OTHR SPECIMN AEROBIC: CPT

## 2021-04-09 NOTE — EDPHYS
Physician Documentation                                                                           

 [facility]                                                                 

Name: Tito Anthony                                                                                

Age: 25 yrs                                                                                       

Sex: Male                                                                                         

: 1995                                                                                   

MRN: [id number]                                                                                   

Arrival Date: 2021                                                                          

Time: 07:40                                                                                       

Account#: [id number]                                                                            

Bed 20                                                                                            

Private MD:                                                                                       

ED Physician Jorge Diego                                                                      

HPI:                                                                                              

                                                                                             

08:43 This 25 yrs old Black Male presents to ER via Ambulatory with complaints of Difficulty  wenceslao 

      Swallowing, Cough.                                                                          

08:43 The patient presents with sore throat, dysphagia, of both solids and liquids. The       wenceslao 

      patient describes throat pain as burning. Onset: The symptoms/episode began/occurred 3      

      day(s) ago. Severity of symptoms: At their worst the symptoms were mild, in the             

      emergency department the symptoms are unchanged. Modifying factors: The symptoms are        

      alleviated by nothing, the symptoms are aggravated by fluids, foods, swallowing.            

      Associated signs and symptoms: Pertinent positives: cough, fever, flu-like symptoms.        

      The patient has not experienced similar symptoms in the past.                               

                                                                                                  

Historical:                                                                                       

- Allergies:                                                                                      

07:52 No Known Allergies;                                                                     ss  

- Home Meds:                                                                                      

07:52 None [Active];                                                                          ss  

- PMHx:                                                                                           

07:52 None;                                                                                   ss  

- PSHx:                                                                                           

07:52 None;                                                                                   ss  

                                                                                                  

- Immunization history:: Adult Immunizations unknown.                                             

- Social history:: Smoking status: Patient denies any tobacco usage or history of.                

                                                                                                  

                                                                                                  

ROS:                                                                                              

08:50 Constitutional: Negative for fever, chills, and weight loss, Eyes: Negative for injury, wenceslao 

      pain, redness, and discharge, Neck: Negative for injury, pain, and swelling,                

      Cardiovascular: Negative for chest pain, palpitations, and edema, Abdomen/GI: Negative      

      for abdominal pain, nausea, vomiting, diarrhea, and constipation, Back: Negative for        

      injury and pain, : Negative for injury, bleeding, discharge, and swelling,                

      MS/Extremity: Negative for injury and deformity, Skin: Negative for injury, rash, and       

      discoloration, Neuro: Negative for headache, weakness, numbness, tingling, and seizure,     

      Psych: Negative for depression, anxiety, suicide ideation, homicidal ideation, and          

      hallucinations, Allergy/Immunology: Negative for hives, rash, and allergies, Endocrine:     

      Negative for neck swelling, polydipsia, polyuria, polyphagia, and marked weight             

      changes, Hematologic/Lymphatic: Negative for swollen nodes, abnormal bleeding, and          

      unusual bruising.                                                                           

08:50 ENT: Positive for difficulty swallowing.                                                    

                                                                                                  

Exam:                                                                                             

08:50 Constitutional:  This is a well developed, well nourished patient who is awake, alert,  wenceslao 

      and in no acute distress. Head/Face:  Normocephalic, atraumatic. Eyes:  Pupils equal        

      round and reactive to light, extra-ocular motions intact.  Lids and lashes normal.          

      Conjunctiva and sclera are non-icteric and not injected.  Cornea within normal limits.      

      Periorbital areas with no swelling, redness, or edema. Neck:  Trachea midline, no           

      thyromegaly or masses palpated, and no cervical lymphadenopathy.  Supple, full range of     

      motion without nuchal rigidity, or vertebral point tenderness.  No Meningismus.             

      Chest/axilla:  Normal chest wall appearance and motion.  Nontender with no deformity.       

      No lesions are appreciated. Respiratory:  Lungs have equal breath sounds bilaterally,       

      clear to auscultation and percussion.  No rales, rhonchi or wheezes noted.  No              

      increased work of breathing, no retractions or nasal flaring. Abdomen/GI:  Soft,            

      non-tender, with normal bowel sounds.  No distension or tympany.  No guarding or            

      rebound.  No evidence of tenderness throughout. Back:  No spinal tenderness.  No            

      costovertebral tenderness.  Full range of motion. Male :  Normal genitalia with no        

      discharge or lesions. Skin:  Warm, dry with normal turgor.  Normal color with no            

      rashes, no lesions, and no evidence of cellulitis. MS/ Extremity:  Pulses equal, no         

      cyanosis.  Neurovascular intact.  Full, normal range of motion. Neuro:  Awake and           

      alert, GCS 15, oriented to person, place, time, and situation.  Cranial nerves II-XII       

      grossly intact.  Motor strength 5/5 in all extremities.  Sensory grossly intact.            

      Cerebellar exam normal.  Normal gait. Psych:  Awake, alert, with orientation to person,     

      place and time.  Behavior, mood, and affect are within normal limits.                       

08:50 ENT: Posterior pharynx: Airway: normal, no evidence of obstruction, Tonsils:                

      bilaterally enlarged, with erythema, Uvula: normal, midline, swelling, that is mild,        

      erythema, that is mild, exudate, is not appreciated.                                        

                                                                                                  

Vital Signs:                                                                                      

07:50  / 77; Pulse 109; Resp 18; Temp 97.1(TE); Pulse Ox 95% on R/A; Weight 127.01 kg;  ss  

      Height 5 ft. 7 in. (170.18 cm);                                                             

09:26 BP 98 / 70; Pulse 90; Resp 20; Pulse Ox 95% on R/A;                                     bw  

10:13  / 53; Pulse 97; Resp 20; Pulse Ox 95% on R/A;                                    bw  

07:50 Body Mass Index 43.85 (127.01 kg, 170.18 cm)                                            ss  

                                                                                                  

MDM:                                                                                              

07:42 Patient medically screened.                                                             wenceslao 

08:56 Differential diagnosis: bronchitis, gastroesophageal reflux disease, group A strep      wenceslao 

      tonsillitis, influenza, laryngitis, peritonsillar abscess pharyngitis, tonsillitis,         

      upper respiratory infection, uvulitis. Differential Diagnosis: Bronchitis Influenza         

      Upper Respiratory Infection Sinusitis Pharyngitis Allergic Rhinitis Viral Syndrome          

      Pneumonia. Data reviewed: vital signs, nurses notes, lab test result(s), radiologic         

      studies, plain films. Data interpreted: Cardiac monitor: rate is 109 beats/min, rhythm      

      is regular, Pulse oximetry: on room air is 95 %. Test interpretation: by ED physician       

      or midlevel provider: plain radiologic studies. Counseling: I had a detailed discussion     

      with the patient and/or guardian regarding: the historical points, exam findings, and       

      any diagnostic results supporting the discharge/admit diagnosis, lab results, radiology     

      results, the need for outpatient follow up, for definitive care, a family practitioner,     

      a pulmonologist.                                                                            

                                                                                                  

                                                                                             

08:17 Order name: CBC with Diff                                                               [facility] 

                                                                                             

08:17 Order name: Comprehensive Metabolic Panel; Complete Time: 10:18                         [facility] 

                                                                                             

08:17 Order name: Strep; Complete Time: 09:24                                                 [facility] 

                                                                                             

08:17 Order name: Ferritin; Complete Time: 10:18                                              [facility] 

                                                                                             

08:17 Order name: CRP; Complete Time: 10:18                                                   [facility] 

                                                                                             

08:17 Order name: BNP; Complete Time: 10:18                                                   [facility] 

                                                                                             

08:17 Order name: Chest Single View XRAY                                                      [facility] 

                                                                                             

08:17 Order name: CBC with Automated Diff; Complete Time: 09:24                               [facility]

                                                                                             

09:26 Order name: Throat Culture                                                              [facility]

                                                                                                  

Administered Medications:                                                                         

09:25 Drug: Decadron - Dexamethasone 10 mg Route: IVP; Site: right hand;                      bw  

10:17 Follow up: Response: No adverse reaction                                                  

09:25 Drug: Pepcid (famotidine) 20 mg Route: IVP; Site: right hand;                           bw  

10:17 Follow up: Response: No adverse reaction                                                bw  

09:25 Drug: Aspirin 162 mg Route: PO;                                                         bw  

10:17 Follow up: Response: No adverse reaction                                                bw  

09:25 Drug: NS 0.9% 500 ml Route: IV; Rate: bolus; Site: right hand;                          bw  

10:17 Follow up: Response: No adverse reaction; IV Status: Completed infusion                 bw  

09:26 Drug: Rocephin - (cefTRIAXone) 1 grams Route: IVPB; Infused Over: 30 mins; Site: right  bw  

      hand;                                                                                       

10:17 Follow up: Response: No adverse reaction; IV Status: Completed infusion                 bw  

09:26 Drug: Zithromax (azithromycin) 500 mg Route: IVPB; Infused Over: 1 hrs; Site: right       

      hand;                                                                                       

11:08 Follow up: Response: No adverse reaction; IV Status: Completed infusion                 bw  

10:48 Drug: Potassium Effervescent Tablet 25 mEq Route: PO;                                   bw  

11:07 Follow up: Response: No adverse reaction                                                  

                                                                                                  

                                                                                                  

Disposition:                                                                                      

21 10:20 Discharged to Home. Impression: Other viral pneumonia - Covid 19, Acute            

  pharyngitis, Cough, Hypokalemia.                                                                

- Condition is Stable.                                                                            

- Discharge Instructions: Potassium Content of Foods, Pharyngitis, Community-Acquired             

  Pneumonia, Adult, Cool Mist Vaporizer, Pharyngitis, Easy-to-Read, Cough, Adult,                 

  Easy-to-Read, Aspirin and Your Heart, Cough, Adult, Sore Throat, Easy-to-Read,                  

  Hypokalemia, COVID-19.                                                                          

- Prescriptions for dexamethasone 2 mg Oral tablet - take 1 tablet by ORAL route 3                

  times per day; 15 tablet. ivermectin 3 mg Oral tablet - take 4 tablet by ORAL route             

  once daily x1 dose on day # 1and day #3; 8 tablet. Pepcid 20 mg Oral Tablet - take 1            

  tablet by ORAL route every 12 hours for 10 days; 30 tablet. Albuterol Sulfate 90                

  mcg/actuation Inhalation - inhale 2 puff by INHALATION route every 4-6 hours; 1                 

  Inhaler. Zithromax 500 mg Oral Tablet - take 1 tablet by ORAL route once daily for 5            

  days; 5 tablet.                                                                                 

- Medication Reconciliation Form, Thank You Letter, Antibiotic Education, Prescription            

  Opioid Use, Work release form form.                                                             

- Follow up: Private Physician; When: 2 - 3 days; Reason: Recheck today's complaints,             

  Re-evaluation by your physician. Follow up: Espinoza Bailey; When: 2 - 3 days;                  

  Reason: Recheck today's complaints, Continuance of care, Re-evaluation by your                  

  physician.                                                                                      

- Problem is new.                                                                                 

- Symptoms have improved.                                                                         

                                                                                                  

                                                                                                  

                                                                                                  

Signatures:                                                                                       

Dispatcher [employer]                           EDJorge Monte MD MD cha Smirch, Shelby, RN RN                                                      

Lyndsey Bernal RN                       RN                                                      

                                                                                                  

Corrections: (The following items were deleted from the chart)                                    

11:09 10:20 2021 10:20 Discharged to Home. Impression: Other viral pneumonia - Covid    bw  

      19; Acute pharyngitis; Cough; Hypokalemia. Condition is Stable. Discharge Instructions:     

      Pharyngitis, Community-Acquired Pneumonia, Adult, Cool Mist Vaporizer, Pharyngitis,         

      Easy-to-Read, Cough, Adult, Easy-to-Read, Aspirin and Your Heart, Cough, Adult, Sore        

      Throat, Easy-to-Read, COVID-19. Prescriptions for dexamethasone 2 mg Oral tablet - take     

      1 tablet by ORAL route 3 times per day; 15 tablet, ivermectin 3 mg Oral tablet - take 4     

      tablet by ORAL route once daily x1 dose on day # 1and day #3; 8 tablet, Pepcid 20 mg        

      Oral Tablet - take 1 tablet by ORAL route every 12 hours for 10 days; 30 tablet,            

      Albuterol Sulfate 90 mcg/actuation Inhalation - inhale 2 puff by INHALATION route every     

      4-6 hours; 1 Inhaler, Zithromax 500 mg Oral Tablet - take 1 tablet by ORAL route once       

      daily for 5 days; 5 tablet. and Forms are Medication Reconciliation Form, Thank You         

      Letter, Antibiotic Education, Prescription Opioid Use. Follow up: Private Physician;        

      When: 2 - 3 days; Reason: Recheck today's complaints, Re-evaluation by your physician.      

      Follow up: Espinoza Bailey; When: 2 - 3 days; Reason: Recheck today's complaints,           

      Continuance of care, Re-evaluation by your physician. Problem is new. Symptoms have         

      improved. wenceslao                                                                               

                                                                                                  

**************************************************************************************************

## 2021-04-09 NOTE — XMS REPORT
Continuity of Care Document

                            Created on:2021



Patient:STEVEN CORADO

Sex:Male

:1995

External Reference #:[id number]





Demographics







                          Address                   [address]



                                                    [place] [address]

 

                          Home Phone                [phone]

 

                          Work Phone                [phone]

 

                          Mobile Phone              [phone]

 

                          Email Address             NONE

 

                          Preferred Language        English

 

                          Marital Status            Unknown

 

                          Church Affiliation     000

 

                          Race                      Unknown

 

                          Additional Race(s)        Unavailable



                                                    Black or 

 

                          Ethnic Group              Unknown









Author







                          Organization              [facility]

t

 

                          Address                   [address]



                                                    [place] [address]

 

                          Phone                     [phone]









Support







                Name            Relationship    Address         Phone

 

                Raj           Mother          Unavailable     [phone]









Care Team Providers







                    Name                Role                Phone

 

                    Lab,  Covid         Attending Clinician Unavailable

 

                    Virgilio GARCIA             Attending Clinician Unavailable

 

                    Only,  Test         Attending Clinician Unavailable









Problems

This patient has no known problems.



Allergies, Adverse Reactions, Alerts

This patient has no known allergies or adverse reactions.



Medications

This patient has no known medications.



Procedures

This patient has no known procedures.



Encounters







        Start   End     Encounter Admission Attending Care    Care    Encounter 

Source



        Date/Time Date/Time Type    Type    Clinicians Facility Department ID   

   

 

        2020-10-22 2020-10-22 Letter          Lab, Pcp [facility]    [id number]

[id number] 



        00:00:00 00:00:00 (Out)           [facility]  [id number]         



                                                [facility] [id number]         



                                                [facility] [phone]         



                                                nal     044             



                                                Office                  



                                                Building                 



                                                One                     

 

        2020-10-21 2020-10-21 Telephone         Hilda Poole    [id number] 

[id number] 



        00:00:00 00:00:00                         ROSIBEL   [id number]         



                                                [facility] [id number]         



                                                        [phone]         



                                                        019             

 

        2020-10-20 2020-10-20 Laboratory         Only, Adc [facility]    [id number]

[id number] 



        14:34:58 14:49:58 Only            Test    [facility] [id number]         



                                                [place] [id number]         



                                                [place]  [phone]         



                                                        353             







Results

This patient has no known results.

## 2021-04-09 NOTE — RAD REPORT
EXAM DESCRIPTION:  RAD - Chest Single View - 4/9/2021 9:26 am

 

CLINICAL HISTORY:  COUGH, COVID Diagnosis April seconds

 

COMPARISON:  Two view chest April 2, 2021

 

TECHNIQUE:  AP portable chest image was obtained 4/9/2021 9:26 am .

 

FINDINGS:  Since prior imaging significant airspace opacification has developed in the mid and lower 
lung fields. This is a substantial progression from April 2nd.

 

 Heart size is prominent but stable. Pulmonary vasculature within normal limits. Trachea is midline. 
No measurable pleural effusion and no pneumothorax. No acute bony abnormality seen. No acute aortic f
indings suspected.

 

IMPRESSION:  Moderate severity bilateral COVID-19 pneumonia.

 

Findings represent significant progression from the April 2nd imaging.

## 2021-04-09 NOTE — ER
Nurse's Notes                                                                                     

 [facility]                                                                 

Name: Tito Anthony                                                                                

Age: 25 yrs                                                                                       

Sex: Male                                                                                         

: 1995                                                                                   

MRN: [id number]                                                                                   

Arrival Date: 2021                                                                          

Time: 07:40                                                                                       

Account#: [id number]                                                                            

Bed 20                                                                                            

Private MD:                                                                                       

Diagnosis: Other viral pneumonia-Covid 19;Acute pharyngitis;Cough;Hypokalemia                     

                                                                                                  

Presentation:                                                                                     

                                                                                             

07:50 Chief complaint: Patient states: Diagnosed with Covid on . Pt states he is back      ss  

      today because he feels like he is having difficulty swallowing because of all the mucus     

      he has. Also c/o painful cough. Denies fever. Coronavirus screen: Client denies travel      

      out of the [place] in the last 14 days. Ebola Screen: Patient denies exposure to               

      infectious person. Patient denies travel to an Ebola-affected area in the 21 days           

      before illness onset. Initial Sepsis Screen: Does the patient meet any 2 criteria? No.      

      Patient's initial sepsis screen is negative. Does the patient have a suspected source       

      of infection? No. Patient's initial sepsis screen is negative. Risk Assessment: Do you      

      want to hurt yourself or someone else? Patient reports no desire to harm self or            

      others. Onset of symptoms was 2021.                                               

07:50 Method Of Arrival: Ambulatory                                                           ss  

07:50 Acuity: RASHI 3                                                                           ss  

                                                                                                  

Triage Assessment:                                                                                

11:07 General: Appears in no apparent distress. Behavior is calm, cooperative, appropriate    bw  

      for age.                                                                                    

                                                                                                  

Historical:                                                                                       

- Allergies:                                                                                      

07:52 No Known Allergies;                                                                     ss  

- Home Meds:                                                                                      

07:52 None [Active];                                                                          ss  

- PMHx:                                                                                           

07:52 None;                                                                                   ss  

- PSHx:                                                                                           

07:52 None;                                                                                   ss  

                                                                                                  

- Immunization history:: Adult Immunizations unknown.                                             

- Social history:: Smoking status: Patient denies any tobacco usage or history of.                

                                                                                                  

                                                                                                  

Screenin:26 Abuse screen: Denies threats or abuse. Nutritional screening: No deficits noted.        bw  

      Tuberculosis screening: No symptoms or risk factors identified. Fall Risk None              

      identified.                                                                                 

                                                                                                  

Assessment:                                                                                       

09:26 Pain: Complains of pain in throat. Neuro: No deficits noted. Cardiovascular: No         bw  

      deficits noted. Respiratory: No deficits noted. GI: Reports nausea. : No deficits         

      noted. No signs and/or symptoms were reported regarding the genitourinary system. EENT:     

      Reports difficulty swallowing pain in throat.                                               

10:13 Reassessment: Patient appears in no apparent distress at this time. No changes from       

      previously documented assessment. Patient and/or family updated on plan of care and         

      expected duration. Pain level reassessed. Patient is alert, oriented x 3, equal             

      unlabored respirations, skin warm/dry/pink.                                                 

11:03 Reassessment: Patient appears in no apparent distress at this time. No changes from       

      previously documented assessment. Patient and/or family updated on plan of care and         

      expected duration. Pain level reassessed.                                                   

                                                                                                  

Vital Signs:                                                                                      

07:50  / 77; Pulse 109; Resp 18; Temp 97.1(TE); Pulse Ox 95% on R/A; Weight 127.01 kg;  ss  

      Height 5 ft. 7 in. (170.18 cm);                                                             

09:26 BP 98 / 70; Pulse 90; Resp 20; Pulse Ox 95% on R/A;                                     bw  

10:13  / 53; Pulse 97; Resp 20; Pulse Ox 95% on R/A;                                    bw  

07:50 Body Mass Index 43.85 (127.01 kg, 170.18 cm)                                              

                                                                                                  

ED Course:                                                                                        

07:40 Patient arrived in ED.                                                                  ds1 

07:42 Jorge Diego MD is Attending Physician.                                             wenceslao 

07:51 Triage completed.                                                                       ss  

07:52 Lyndsey Bernal, RN is Primary Nurse.                                                     bw  

07:52 Arm band placed on right wrist.                                                         ss  

09:26 Chest Single View XRAY In Process Unspecified.                                          EDMS

09:26 Patient has correct armband on for positive identification. Call light in reach. Side   bw  

      rails up X 1. Pulse ox on. NIBP on. Warm blanket given.                                     

09:26 CBC with Diff Sent.                                                                     bw  

09:26 No provider procedures requiring assistance completed. Inserted saline lock: 20 gauge   bw  

      in right hand, using aseptic technique. Blood collected.                                    

10:20 Espinoza Bailey MD is Referral Physician.                                             wenceslao 

11:03 IV discontinued.                                                                        bw  

                                                                                                  

Administered Medications:                                                                         

09:25 Drug: Decadron - Dexamethasone 10 mg Route: IVP; Site: right hand;                      bw  

10:17 Follow up: Response: No adverse reaction                                                bw  

09:25 Drug: Pepcid (famotidine) 20 mg Route: IVP; Site: right hand;                           bw  

10:17 Follow up: Response: No adverse reaction                                                  

09:25 Drug: Aspirin 162 mg Route: PO;                                                         bw  

10:17 Follow up: Response: No adverse reaction                                                bw  

09:25 Drug: NS 0.9% 500 ml Route: IV; Rate: bolus; Site: right hand;                          bw  

10:17 Follow up: Response: No adverse reaction; IV Status: Completed infusion                   

09:26 Drug: Rocephin - (cefTRIAXone) 1 grams Route: IVPB; Infused Over: 30 mins; Site: right  bw  

      hand;                                                                                       

10:17 Follow up: Response: No adverse reaction; IV Status: Completed infusion                 bw  

09:26 Drug: Zithromax (azithromycin) 500 mg Route: IVPB; Infused Over: 1 hrs; Site: right     bw  

      hand;                                                                                       

11:08 Follow up: Response: No adverse reaction; IV Status: Completed infusion                 bw  

10:48 Drug: Potassium Effervescent Tablet 25 mEq Route: PO;                                   bw  

11:07 Follow up: Response: No adverse reaction                                                bw  

                                                                                                  

                                                                                                  

Outcome:                                                                                          

10:20 Discharge ordered by MD.                                                                wenceslao 

11:03 Discharged to home ambulatory.                                                            

11:03 Condition: stable                                                                           

11:03 Discharge instructions given to patient.                                                    

11:09 Patient left the ED.                                                                      

                                                                                                  

Signatures:                                                                                       

Dispatcher [employer]                           EDJorge Monte MD MD cha Sanford, Demi                                ds1                                                  

Jenifer Maciel RN RN   ss                                                   

Lyndsey Bernal RN                       RN                                                      

                                                                                                  

Corrections: (The following items were deleted from the chart)                                    

07:55 07:50 Acuity: RASHI 3 ss                                                                  ss  

08:58 07:50 Acuity: RASHI 4 ss                                                                  ss  

                                                                                                  

**************************************************************************************************

## 2022-12-13 ENCOUNTER — HOSPITAL ENCOUNTER (EMERGENCY)
Dept: HOSPITAL 97 - ER | Age: 27
Discharge: HOME | End: 2022-12-13
Payer: SELF-PAY

## 2022-12-13 VITALS — TEMPERATURE: 98.8 F | SYSTOLIC BLOOD PRESSURE: 135 MMHG | DIASTOLIC BLOOD PRESSURE: 78 MMHG | OXYGEN SATURATION: 98 %

## 2022-12-13 DIAGNOSIS — H16.012: Primary | ICD-10-CM

## 2022-12-13 PROCEDURE — 99284 EMERGENCY DEPT VISIT MOD MDM: CPT

## 2022-12-13 NOTE — ER
Nurse's Notes                                                                                     

 [facility]                                                                 

Name: Tito Anthony                                                                                

Age: 27 yrs                                                                                       

Sex: Male                                                                                         

: 1995                                                                                   

MRN: [id number]                                                                                   

Arrival Date: 2022                                                                          

Time: 20:40                                                                                       

Account#: [id number]                                                                            

Bed 10                                                                                            

Private MD:                                                                                       

Diagnosis: Central corneal ulcer, left eye                                                        

                                                                                                  

Presentation:                                                                                     

                                                                                             

20:48 Chief complaint: Patient states: "I think I got some dirt in my eye". Ebola Screen: No  vc1 

      symptoms or risks identified at this time. Risk Assessment: Do you want to hurt             

      yourself or someone else? Patient reports no desire to harm self or others. Onset of        

      symptoms was 2022.                                                             

20:48 Method Of Arrival: Ambulatory                                                           vc1 

20:48 Acuity: RASHI 4                                                                           vc1 

                                                                                                  

Triage Assessment:                                                                                

20:49 General: Appears in no apparent distress. uncomfortable, Behavior is calm, cooperative, vc1 

      appropriate for age. Pain: Complains of pain in left eye Pain does not radiate. EENT:       

      Eyes are tearing on outer aspect of conjuctiva of left eye and inner aspect of              

      conjunctiva of left eye reddened. Reports pain in left eye. Neuro: Level of                 

      Consciousness is awake, alert, obeys commands, Oriented to person, place, time,             

      situation, Appropriate for age. Cardiovascular: No deficits noted. Respiratory: No          

      deficits noted. GI: No deficits noted. : No deficits noted. Derm: No deficits noted.      

      Musculoskeletal: No deficits noted.                                                         

                                                                                                  

Historical:                                                                                       

- Allergies:                                                                                      

20:50 No Known Allergies;                                                                     vc1 

- Home Meds:                                                                                      

20:50 None [Active];                                                                          vc1 

- PMHx:                                                                                           

20:50 None;                                                                                   vc1 

- PSHx:                                                                                           

20:50 None;                                                                                   vc1 

                                                                                                  

- Immunization history:: Client reports receiving the 2nd dose of the Covid vaccine,              

  [employer].                                                                                         

- Social history:: Smoking status: Patient denies any tobacco usage or history of.                

- Family history:: not pertinent.                                                                 

- Hospitalizations: : No recent hospitalization is reported.                                      

                                                                                                  

                                                                                                  

Screenin:50 Abuse screen: Denies threats or abuse. Nutritional screening: No deficits noted.        vc1 

      Tuberculosis screening: No symptoms or risk factors identified. Fall Risk No fall in        

      past 12 months (0 pts).                                                                     

20:51 [facility] ED Fall Risk Assessment (Adult) History of falling in the last 3 months,       vc1 

      including since admission No falls in past 3 months (0 pts). Humpty Dumpty Scale Fall       

      Assessment Tool (age< 18yrs) Age 13 years and above (1 pt) Gender Male (2 pts)              

      Diagnosis Cognitive Impairments Oriented to own ability (1 pt).                             

                                                                                                  

Assessment:                                                                                       

21:01 General: Appears in no apparent distress. uncomfortable, Behavior is calm, cooperative, ld1 

      appropriate for age. Pain: Complains of pain in left eye Pain does not radiate. Pain        

      currently is 10 out of 10 on a pain scale. Quality of pain is described as stabbing,        

      throbbing, Pain began suddenly. Neuro: Level of Consciousness is awake, alert, obeys        

      commands, Oriented to person, place, time, situation, Appropriate for age.                  

      Cardiovascular: Capillary refill < 3 seconds Patient's skin is warm and dry.                

      Respiratory: Airway is patent Respiratory effort is even, unlabored. GI: Abdomen is         

      flat, non-distended. EENT: Eyes are tearing on outer aspect of conjuctiva of left eye,      

      iris of left eye and inner aspect of conjunctiva of left eye.                               

                                                                                                  

Vital Signs:                                                                                      

20:52 Weight 129.27 kg; Height 5 ft. 7 in. (170.18 cm); Pain 0/10;                            vc1 

20:52  / 78; Pulse 77; Resp 18; Temp 98.8; Pulse Ox 98% ;                               vc1 

20:52 Body Mass Index 44.64 (129.27 kg, 170.18 cm)                                            vc1 

                                                                                                  

ED Course:                                                                                        

20:40 Patient arrived in ED.                                                                  bp1 

20:41 Saji Lu MD is Attending Physician.                                                rn  

20:49 Triage completed.                                                                       vc1 

20:50 Arm band placed on right wrist.                                                         vc1 

20:59 Suyapa Mcintyre, JOSE is Primary Nurse.                                                   ld1 

21:01 Patient has correct armband on for positive identification. Placed in gown. Bed in low  ld1 

      position. Call light in reach. Side rails up X2. Pulse ox on. NIBP on. Door closed.         

      Noise minimized. Warm blanket given.                                                        

21:01 Assist provider with eye exam of left eye. using fluorescein stain, Performed by Saji Lu MD Patient tolerated well.                                                            

21:44 Leonid Parnell MD is Referral Physician.                                              rn  

22:10 Patient did not have IV access during this emergency room visit.                        eh3 

                                                                                                  

Administered Medications:                                                                         

21:28 Drug: Tetracaine Drops 0.5 % 1 drops Route: Ophthalmic; Site: left eye;                 ld1 

21:54 Drug: Gentamicin Ointment 0.3 % 0.5 inches Route: Ophthalmic; Site: left eye;           eh3 

                                                                                                  

                                                                                                  

Medication:                                                                                       

20:52 VIS not applicable for this client.                                                     vc1 

                                                                                                  

Outcome:                                                                                          

21:44 Discharge ordered by MD.                                                                rn  

22:10 Discharged to home with significant other.                                              3 

22:10 Condition: stable                                                                           

22:10 Discharge instructions given to patient, significant other, Instructed on discharge         

      instructions, follow up and referral plans. medication usage, Demonstrated                  

      understanding of instructions, follow-up care, medications, Prescriptions given X 1.        

22:10 Patient left the ED.                                                                    3 

                                                                                                  

Signatures:                                                                                       

Saji Lu MD MD rn Paniauga, Brittany bp1 Dibbern, Lauren RN                     RN   ld1                                                  

Cara Kent RN                    RN   vc1                                                  

Mimi Valenzuela RN                          RN   eh3                                                  

                                                                                                  

**************************************************************************************************

## 2022-12-13 NOTE — XMS REPORT
Continuity of Care Document

                          Created on:2022



Patient:STEVEN ANTHONY

Sex:Male

:1995

External Reference #:[id number]





Demographics







                          Address                   [address]



                                                    [place] [address]

 

                          Home Phone                [phone]

 

                          Work Phone                [phone]

 

                          Mobile Phone              [phone]

 

                          Email Address             [email or web address]

 

                          Preferred Language        English

 

                          Marital Status            Unknown

 

                          Amish Affiliation     Unknown

 

                          Race                      Unknown

 

                          Additional Race(s)        Unavailable



                                                    Black or 

 

                          Ethnic Group              Unknown









Author







                          Organization              [facility]

t

 

                          Address                   [address]



                                                    [place] [address]

 

                          Phone                     [phone]









Support







                Name            Relationship    Address         Phone

 

                Milla Anthony   Mother          Unavailable     [phone]









Care Team Providers







                    Name                Role                Phone

 

                    Lab, Pcp Covid      Attending Clinician Unavailable

 

                    Hilda Poole RN      Attending Clinician Unavailable

 

                    Only, Adc Test      Attending Clinician Unavailable

 

                    Timothy Stearns MD   Attending Clinician [phone]

 

                    TIMOTHY STEARNS      Attending Clinician Unavailable

 

                    genie         Attending Clinician Unavailable

 

                    [email or web address]         Admitting Clinician Unavailable









Payers







           Payer Name Policy Type Policy Number Effective Date Expiration Date S

ource







Problems

This patient has no known problems.



Allergies, Adverse Reactions, Alerts







       Allergy Allergy Status Severity Reaction(s) Onset  Inactive Treating Comm

ents 

Source



       Name   Type                        Date   Date   Clinician        

 

       NO KNOWN Drug   Active                                           [facility]



       ALLERGIE Class                                                   ity of



       [facility]







Social History







           Social Habit Start Date Stop Date  Quantity   Comments   Source

 

           Sex Assigned At Birth                                             [facility]

 

           [facility]-CoV-2                       Not sure              [facility]

[facility]



           (event[facility]                                                [facility]









                Smoking Status  Start Date      Stop Date       Source

 

                Unknown if ever smoked                                 [facility]

y [facility]







Medications

This patient has no known medications.



Procedures

This patient has no known procedures.



Encounters







        Start   End     Encounter Admission Attending Care    Care    Encounter 

Source



        Date/Time Date/Time Type    Type    Clinicians Facility Department ID   

   

 

        2020-10-22 2020-10-22 Letter          Lab, Pcp RONALD    [id number] [id number]

[id number] 



        00:00:00 00:00:00 (Out)           [employer]  350.1.13.10         



                                                [place] [id number]         



                                                [place] [phone]         



                                                nal     [address]             



                                                Office                  



                                                Building                 



                                                One                     

 

        2020-10-22 2020-10-22 Letter          Lab, Pcp [facility]    1.2.840.114 [id number]

[id number] Univers



        00:00:00 00:00:00 (Out)           Covid   Health  350.1.13.10         it

y of



                                                [place] [id number]         Chris

as



                                                [place] [phone]         Me

dical



                                                nal     [address]             [place]



                                                Office                  



                                                Building                 



                                                One                     

 

        2020-10-21 2020-10-21 Telephone         Hilda Poole    [id number] 

[id number] 



        00:00:00 00:00:00                         ROSIBEL   350.1.13.10         



                                                [facility] [id number]         



                                                        [phone]         



                                                        [facility]             

 

        2020-10-21 2020-10-21 Telephone         Hilda Poole    [id number] 

[id number] Univers



        00:00:00 00:00:00                         ROSIBEL   350.1.13.10         it

y of



                                                [facility] [id number]         Chris

as



                                                        [phone]         [address]

 

        2020-10-20 2020-10-20 Laboratory         Only, [facility] Test [facility]    1.2.840.

[id number] 

Univers



        14:34:58 14:49:58 Only            Timothy Stearns [place] 350.1.13.10   

      ity of



                                                [place] [id number]         [place]

s



                                                [place]  [phone]         [address]

 

        2020-10-20 2020-10-20 Laboratory         Only, [facility]    1.2.840.114 [id number]

[id number] 



        14:34:58 14:49:58 Only            Test    [place] 350.1.13.10         



                                                [place] [id number]         



                                                [place]  [phone]         



                                                        [facility]             

 

        2020-10-20 2020-10-20 Outpatient KIMBER STEARNS  [facility]    [id number]

[id number] Univers



        14:30:00 14:30:00                 TIMOTHY voss of



                                                                        [facility]

 

        2020 Outpatient         genie [facility]     [facility]     [address]

 [place]



        05:08:00 05:08:00                                         [id number]    



                                                                        Medical



                                                                        Group







Results

This patient has no known results.

## 2022-12-13 NOTE — EDPHYS
Physician Documentation                                                                           

 [facility]                                                                 

Name: Tito Anthony                                                                                

Age: 27 yrs                                                                                       

Sex: Male                                                                                         

: 1995                                                                                   

MRN: [id number]                                                                                   

Arrival Date: 2022                                                                          

Time: 20:40                                                                                       

Account#: [id number]                                                                            

Bed 10                                                                                            

Private MD:                                                                                       

ED Physician Saji Lu                                                                         

HPI:                                                                                              

                                                                                             

20:53 This 27 yrs old Black Male presents to ER via Ambulatory with complaints of Foreign     rn  

      Body In Eye.                                                                                

20:53 This 27 yrs old Black Male presents to ER via Ambulatory with complaints of Foreign     rn  

      Body In Eye.                                                                                

20:53 The patient is experiencing foreign body sensation, The patient sustained dirt in eye,  rn  

      to the left eye, caused by debris. Onset: The symptoms/episode began/occurred today.        

      Duration: the symptoms are continuous. Aggravated by rubbing, Alleviated by eye flush.      

      Associated signs and symptoms: Pertinent negatives: fever. Patient does not utilize any     

      form of vision correction. Severity of symptoms: At their worst the symptoms were           

      moderate in the emergency department the symptoms are unchanged. The patient has not        

      experienced similar symptoms in the past. The patient has not recently seen a               

      physician. Pt reports at work, dirt got in eye, irrigated immediately, felt better for      

      hours, then when driving home began to feel more irritation and drainage from eye. No       

      contacts. No vision changes. Eye drops help temporarily. .                                  

                                                                                                  

Historical:                                                                                       

- Allergies:                                                                                      

20:50 No Known Allergies;                                                                     vc1 

- Home Meds:                                                                                      

20:50 None [Active];                                                                          vc1 

- PMHx:                                                                                           

20:50 None;                                                                                   vc1 

- PSHx:                                                                                           

20:50 None;                                                                                   vc1 

                                                                                                  

- Immunization history:: Client reports receiving the 2nd dose of the Covid vaccine,              

  Pfizer.                                                                                         

- Social history:: Smoking status: Patient denies any tobacco usage or history of.                

- Family history:: not pertinent.                                                                 

- Hospitalizations: : No recent hospitalization is reported.                                      

                                                                                                  

                                                                                                  

ROS:                                                                                              

20:53 Constitutional: Negative for fever, chills, and weight loss, Eyes: + foreign body       rn  

      sensation to left eye with drainage                                                         

                                                                                                  

Exam:                                                                                             

20:53 Constitutional:  This is a well developed, well nourished patient who is awake, alert,  rn  

      and in no acute distress. Head/Face:  Normocephalic, atraumatic. Eyes:  + left eye          

      erythema with drainage, neg hyphema, neg hypopyon, PERRL, no foreign body with lids         

      everted. + central corneal ulcer with fluorescein uptake on exam.                           

                                                                                                  

Vital Signs:                                                                                      

20:52 Weight 129.27 kg; Height 5 ft. 7 in. (170.18 cm); Pain 0/10;                            vc1 

20:52  / 78; Pulse 77; Resp 18; Temp 98.8; Pulse Ox 98% ;                               vc1 

20:52 Body Mass Index 44.64 (129.27 kg, 170.18 cm)                                            vc1 

                                                                                                  

MDM:                                                                                              

20:41 Patient medically screened.                                                             rn  

21:42 Differential diagnosis: Corneal abrasion of Corneal ulcer of left eye. Foreign body in. rn  

      Data reviewed: vital signs, nurses notes, and as a result, I will discharge patient.        

      Counseling: I had a detailed discussion with the patient and/or guardian regarding: the     

      historical points, exam findings, and any diagnostic results supporting the                 

      discharge/admit diagnosis, the need for outpatient follow up, to return to the              

      emergency department if symptoms worsen or persist or if there are any questions or         

      concerns that arise at home. Special discussion: I discussed with the patient/guardian      

      in detail that at this point there is no indication for admission to the hospital. It       

      is understood, however, that if the symptoms persist or worsen the patient needs to         

      return immediately for re-evaluation. Based on the history and exam findings, there is      

      no indication for further emergent testing or inpatient evaluation. I discussed with        

      the patient/guardian the need to see the opthamologist for further evaluation of the        

      symptoms.                                                                                   

                                                                                                  

                                                                                             

20:51 Order name: Eye Tray; Complete Time: 21:00                                              rn  

                                                                                             

20:51 Order name: Fluoresene Opth strip; Complete Time: 21:00                                 rn  

                                                                                                  

Administered Medications:                                                                         

21:28 Drug: Tetracaine Drops 0.5 % 1 drops Route: Ophthalmic; Site: left eye;                 ld1 

21:54 Drug: Gentamicin Ointment 0.3 % 0.5 inches Route: Ophthalmic; Site: left eye;           eh3 

                                                                                                  

                                                                                                  

Disposition Summary:                                                                              

22 21:44                                                                                    

Discharge Ordered                                                                                 

      Location: Home                                                                          rn  

      Problem: new                                                                            rn  

      Symptoms: have improved                                                                 rn  

      Condition: Stable                                                                       rn  

      Diagnosis                                                                                   

        - Central corneal ulcer, left eye                                                     rn  

      Followup:                                                                               rn  

        - With: Leonid Parnell MD                                                                

        - When: 1 - 2 days                                                                         

        - Reason: Recheck today's complaints, Re-evaluation by your physician                      

      Discharge Instructions:                                                                     

        - Discharge Summary Sheet                                                             rn  

        - Corneal Ulcer                                                                       rn  

        - How to Use Eye Drops and Eye Ointments                                              rn  

      Forms:                                                                                      

        - Medication Reconciliation Form                                                      rn  

        - Thank You Letter                                                                    rn  

        - Antibiotic Education                                                                rn  

        - Prescription Opioid Use                                                             rn  

        - Work release form                                                                   ld1 

      Prescriptions:                                                                              

        - Vigamox 0.5 % Ophthalmic Drops                                                           

            - instill 1 drop by OPHTHALMIC route every 8 hours for 7 days; 5 milliliter;      rn  

      Refills: 0, Product Selection Permitted                                                     

Signatures:                                                                                       

Saji Lu MD MD   rn                                                   

Suyapa Mcintyre RN                     RN   ld1                                                  

Cara Kent RN                    RN   vc1                                                  

Mimi Valenzuela RN                          RN   eh3                                                  

                                                                                                  

Corrections: (The following items were deleted from the chart)                                    

21:43 20:53 Constitutional: This is a well developed, well nourished patient who is awake,    rn  

      alert, and in no acute distress. Head/Face: Normocephalic, atraumatic. Eyes: + left eye     

      erythema with drainage, neg hyphema, neg hypopyon, PERRL rn                                 

                                                                                                  

**************************************************************************************************